# Patient Record
Sex: MALE | Race: WHITE | URBAN - METROPOLITAN AREA
[De-identification: names, ages, dates, MRNs, and addresses within clinical notes are randomized per-mention and may not be internally consistent; named-entity substitution may affect disease eponyms.]

---

## 2019-02-11 ENCOUNTER — NEW PATIENT (OUTPATIENT)
Dept: URBAN - METROPOLITAN AREA CLINIC 30 | Facility: CLINIC | Age: 79
End: 2019-02-11
Payer: MEDICARE

## 2019-02-11 PROCEDURE — 92004 COMPRE OPH EXAM NEW PT 1/>: CPT | Performed by: OPTOMETRIST

## 2019-02-11 PROCEDURE — 92134 CPTRZ OPH DX IMG PST SGM RTA: CPT | Performed by: OPTOMETRIST

## 2019-02-11 ASSESSMENT — VISUAL ACUITY
OS: 20/30
OD: 20/30

## 2019-02-11 ASSESSMENT — INTRAOCULAR PRESSURE
OD: 27
OS: 25

## 2019-02-11 ASSESSMENT — KERATOMETRY
OS: 43.87
OD: 43.95

## 2019-03-15 ENCOUNTER — FOLLOW UP ESTABLISHED (OUTPATIENT)
Dept: URBAN - METROPOLITAN AREA CLINIC 30 | Facility: CLINIC | Age: 79
End: 2019-03-15
Payer: MEDICARE

## 2019-03-15 PROCEDURE — 92133 CPTRZD OPH DX IMG PST SGM ON: CPT | Performed by: OPTOMETRIST

## 2019-03-15 PROCEDURE — 76514 ECHO EXAM OF EYE THICKNESS: CPT | Performed by: OPTOMETRIST

## 2019-03-15 PROCEDURE — 99213 OFFICE O/P EST LOW 20 MIN: CPT | Performed by: OPTOMETRIST

## 2019-03-15 PROCEDURE — 92083 EXTENDED VISUAL FIELD XM: CPT | Performed by: OPTOMETRIST

## 2019-03-15 ASSESSMENT — INTRAOCULAR PRESSURE
OD: 20
OS: 18

## 2019-07-15 ENCOUNTER — FOLLOW UP ESTABLISHED (OUTPATIENT)
Dept: URBAN - METROPOLITAN AREA CLINIC 30 | Facility: CLINIC | Age: 79
End: 2019-07-15
Payer: MEDICARE

## 2019-07-15 PROCEDURE — 99213 OFFICE O/P EST LOW 20 MIN: CPT | Performed by: OPTOMETRIST

## 2019-07-15 PROCEDURE — 92083 EXTENDED VISUAL FIELD XM: CPT | Performed by: OPTOMETRIST

## 2019-07-15 ASSESSMENT — INTRAOCULAR PRESSURE
OD: 26
OS: 23
OD: 23
OS: 20

## 2019-12-10 ENCOUNTER — FOLLOW UP ESTABLISHED (OUTPATIENT)
Dept: URBAN - METROPOLITAN AREA CLINIC 30 | Facility: CLINIC | Age: 79
End: 2019-12-10
Payer: MEDICARE

## 2019-12-10 DIAGNOSIS — H04.123 TEAR FILM INSUFFICIENCY OF BILATERAL LACRIMAL GLANDS: ICD-10-CM

## 2019-12-10 PROCEDURE — 92014 COMPRE OPH EXAM EST PT 1/>: CPT | Performed by: OPTOMETRIST

## 2019-12-10 PROCEDURE — 92134 CPTRZ OPH DX IMG PST SGM RTA: CPT | Performed by: OPTOMETRIST

## 2019-12-10 ASSESSMENT — INTRAOCULAR PRESSURE
OS: 20
OD: 22

## 2019-12-10 ASSESSMENT — VISUAL ACUITY
OD: 20/40
OS: 20/40

## 2019-12-10 ASSESSMENT — KERATOMETRY
OS: 44.20
OD: 44.09

## 2020-06-10 ENCOUNTER — FOLLOW UP ESTABLISHED (OUTPATIENT)
Dept: URBAN - METROPOLITAN AREA CLINIC 30 | Facility: CLINIC | Age: 80
End: 2020-06-10
Payer: MEDICARE

## 2020-06-10 PROCEDURE — 92083 EXTENDED VISUAL FIELD XM: CPT | Performed by: OPTOMETRIST

## 2020-06-10 PROCEDURE — 99213 OFFICE O/P EST LOW 20 MIN: CPT | Performed by: OPTOMETRIST

## 2020-06-10 PROCEDURE — 92133 CPTRZD OPH DX IMG PST SGM ON: CPT | Performed by: OPTOMETRIST

## 2020-06-10 ASSESSMENT — INTRAOCULAR PRESSURE
OS: 19
OD: 21

## 2021-01-08 ENCOUNTER — FOLLOW UP ESTABLISHED (OUTPATIENT)
Dept: URBAN - METROPOLITAN AREA CLINIC 30 | Facility: CLINIC | Age: 81
End: 2021-01-08
Payer: MEDICARE

## 2021-01-08 DIAGNOSIS — H02.834 DERMATOCHALASIS OF LEFT UPPER EYELID: ICD-10-CM

## 2021-01-08 DIAGNOSIS — H02.831 DERMATOCHALASIS OF RIGHT UPPER EYELID: ICD-10-CM

## 2021-01-08 DIAGNOSIS — Z79.84 LONG TERM (CURRENT) USE OF ORAL HYPOGLYCEMIC DRUGS: ICD-10-CM

## 2021-01-08 DIAGNOSIS — E11.9 TYPE 2 DIABETES MELLITUS WITHOUT COMPLICATIONS: Primary | ICD-10-CM

## 2021-01-08 DIAGNOSIS — H40.023 OPEN ANGLE WITH BORDERLINE FINDINGS, HIGH RISK, BILATERAL: ICD-10-CM

## 2021-01-08 PROCEDURE — 92134 CPTRZ OPH DX IMG PST SGM RTA: CPT | Performed by: OPTOMETRIST

## 2021-01-08 PROCEDURE — 92014 COMPRE OPH EXAM EST PT 1/>: CPT | Performed by: OPTOMETRIST

## 2021-01-08 ASSESSMENT — KERATOMETRY
OD: 43.98
OS: 44.12

## 2021-01-08 ASSESSMENT — VISUAL ACUITY
OS: 20/30
OD: 20/30

## 2021-01-08 ASSESSMENT — INTRAOCULAR PRESSURE
OS: 19
OD: 19

## 2021-07-08 ENCOUNTER — OFFICE VISIT (OUTPATIENT)
Dept: URBAN - METROPOLITAN AREA CLINIC 30 | Facility: CLINIC | Age: 81
End: 2021-07-08
Payer: MEDICARE

## 2021-07-08 PROCEDURE — 92133 CPTRZD OPH DX IMG PST SGM ON: CPT | Performed by: OPTOMETRIST

## 2021-07-08 PROCEDURE — 92083 EXTENDED VISUAL FIELD XM: CPT | Performed by: OPTOMETRIST

## 2021-07-08 PROCEDURE — 99213 OFFICE O/P EST LOW 20 MIN: CPT | Performed by: OPTOMETRIST

## 2021-07-08 ASSESSMENT — INTRAOCULAR PRESSURE
OS: 15
OD: 20

## 2021-07-08 NOTE — IMPRESSION/PLAN
Impression: Open angle with borderline findings, high risk, bilateral
-FHx PACHS 589/582 Plan: IOP stable OD, lower OS today, reasonable. Due to higher IOPs and cupping. RNFL 3/2019 Normal NFL OU. RNFL 6/2020 stable to last, normal NFL OU. RNFL 7/21 stable. VF 3/2019 unreliable OU, essentially full. VF 7/2019 unreliable OU, essentially full. VF 6/2020 essentially stable OU, less reliable OS. VF 7/21 mildly unreliable OU, essentially full.

## 2022-01-10 ENCOUNTER — OFFICE VISIT (OUTPATIENT)
Dept: URBAN - METROPOLITAN AREA CLINIC 30 | Facility: CLINIC | Age: 82
End: 2022-01-10
Payer: MEDICARE

## 2022-01-10 DIAGNOSIS — H25.813 COMBINED FORMS OF AGE-RELATED CATARACT, BILATERAL: ICD-10-CM

## 2022-01-10 PROCEDURE — 92134 CPTRZ OPH DX IMG PST SGM RTA: CPT | Performed by: OPTOMETRIST

## 2022-01-10 PROCEDURE — 92014 COMPRE OPH EXAM EST PT 1/>: CPT | Performed by: OPTOMETRIST

## 2022-01-10 ASSESSMENT — KERATOMETRY
OD: 44.18
OS: 43.95

## 2022-01-10 ASSESSMENT — INTRAOCULAR PRESSURE
OS: 22
OD: 21

## 2022-01-10 ASSESSMENT — VISUAL ACUITY
OS: 20/50
OD: 20/40

## 2022-01-10 NOTE — IMPRESSION/PLAN
Impression: Open angle with borderline findings, high risk, bilateral
-FHx PACHS 589/582 Plan: IOP today (21,22), higher OS. Due to higher IOPs and cupping. RNFL 3/2019 Normal NFL OU. RNFL 6/2020 stable to last, normal NFL OU. RNFL 7/21 stable. VF 3/2019 unreliable OU, essentially full. VF 7/2019 unreliable OU, essentially full. VF 6/2020 essentially stable OU, less reliable OS. VF 7/21 mildly unreliable OU, essentially full. Continue q6mo monitoring. Update diagnostics next visit.

## 2022-01-10 NOTE — IMPRESSION/PLAN
Impression: Combined forms of age-related cataract, bilateral Plan: Patient is comfortable c current level of vision. Continue to monitor. Discussed limitations.

## 2022-07-18 ENCOUNTER — OFFICE VISIT (OUTPATIENT)
Dept: URBAN - METROPOLITAN AREA CLINIC 30 | Facility: CLINIC | Age: 82
End: 2022-07-18
Payer: MEDICARE

## 2022-07-18 DIAGNOSIS — E11.9 TYPE 2 DIABETES MELLITUS WITHOUT COMPLICATIONS: ICD-10-CM

## 2022-07-18 DIAGNOSIS — H43.812 VITREOUS DEGENERATION, LEFT EYE: ICD-10-CM

## 2022-07-18 DIAGNOSIS — Z79.84 LONG TERM (CURRENT) USE OF ORAL HYPOGLYCEMIC DRUGS: ICD-10-CM

## 2022-07-18 DIAGNOSIS — H40.023 OPEN ANGLE WITH BORDERLINE FINDINGS, HIGH RISK, BILATERAL: Primary | ICD-10-CM

## 2022-07-18 DIAGNOSIS — H25.813 COMBINED FORMS OF AGE-RELATED CATARACT, BILATERAL: ICD-10-CM

## 2022-07-18 PROCEDURE — 99213 OFFICE O/P EST LOW 20 MIN: CPT | Performed by: STUDENT IN AN ORGANIZED HEALTH CARE EDUCATION/TRAINING PROGRAM

## 2022-07-18 PROCEDURE — 92134 CPTRZ OPH DX IMG PST SGM RTA: CPT | Performed by: STUDENT IN AN ORGANIZED HEALTH CARE EDUCATION/TRAINING PROGRAM

## 2022-07-18 ASSESSMENT — INTRAOCULAR PRESSURE
OD: 20
OS: 20

## 2022-07-18 NOTE — IMPRESSION/PLAN
Impression: Open angle with borderline findings, high risk, bilateral
-FHx PACHS 589/582 Plan: IOP today ~stable @20/20 Keep appt as scheduled for VF/IOP with Dr Jing Rios

## 2022-07-18 NOTE — IMPRESSION/PLAN
Impression: Type 2 diabetes mellitus without complications Plan: Pt ed stability with no retinopathy on exam today. Reviewed importance of BG control.  Monitor

## 2022-07-18 NOTE — IMPRESSION/PLAN
Impression: Combined forms of age-related cataract, bilateral Plan: Cataracts contribute to reduced vision however ADLs not affected. No treatment recommended at this time.  Monitor

## 2022-07-18 NOTE — IMPRESSION/PLAN
Impression: Vitreous degeneration, left eye: H43.812. Plan: PVD with puente ring. PHF no longer visible in OCT mac OS. Pt ed cause of floater, reassured pt no retinal breaks or detachment. Pt ed symptoms of RD/RT and to seek care immediately if noted. Monitor.

## 2022-07-29 ENCOUNTER — OFFICE VISIT (OUTPATIENT)
Dept: URBAN - METROPOLITAN AREA CLINIC 30 | Facility: CLINIC | Age: 82
End: 2022-07-29
Payer: MEDICARE

## 2022-07-29 DIAGNOSIS — H40.023 OPEN ANGLE WITH BORDERLINE FINDINGS, HIGH RISK, BILATERAL: Primary | ICD-10-CM

## 2022-07-29 PROCEDURE — 92083 EXTENDED VISUAL FIELD XM: CPT | Performed by: OPTOMETRIST

## 2022-07-29 PROCEDURE — 99213 OFFICE O/P EST LOW 20 MIN: CPT | Performed by: OPTOMETRIST

## 2022-07-29 PROCEDURE — 92133 CPTRZD OPH DX IMG PST SGM ON: CPT | Performed by: OPTOMETRIST

## 2022-07-29 ASSESSMENT — INTRAOCULAR PRESSURE
OD: 20
OS: 18

## 2022-07-29 NOTE — IMPRESSION/PLAN
Impression: Open angle with borderline findings, high risk, bilateral
-FHx PACHS 589/582 Plan: IOP today (20,18) s tx. Due to higher IOPs and cupping. RNFL 3/2019 Normal NFL OU. RNFL 6/2020 stable to last, normal NFL OU. RNFL 7/21 stable. RNFL 7/2022: normal NFL OU, appears stable. VF 3/2019 unreliable OU, essentially full. VF 7/2019 unreliable OU, essentially full. VF 6/2020 essentially stable OU, less reliable OS. VF 7/21 mildly unreliable OU, essentially full. VF 7/2022: superior arc OD, full OS.  

Lid may have impacted VF test today OD, will need to repeat c LIDS TAPED

## 2022-08-26 ENCOUNTER — OFFICE VISIT (OUTPATIENT)
Dept: URBAN - METROPOLITAN AREA CLINIC 30 | Facility: CLINIC | Age: 82
End: 2022-08-26
Payer: MEDICARE

## 2022-08-26 DIAGNOSIS — H40.023 OPEN ANGLE WITH BORDERLINE FINDINGS, HIGH RISK, BILATERAL: Primary | ICD-10-CM

## 2022-08-26 PROCEDURE — 92083 EXTENDED VISUAL FIELD XM: CPT | Performed by: OPTOMETRIST

## 2022-08-26 PROCEDURE — 99212 OFFICE O/P EST SF 10 MIN: CPT | Performed by: OPTOMETRIST

## 2022-08-26 ASSESSMENT — INTRAOCULAR PRESSURE
OD: 19
OS: 17

## 2022-08-26 NOTE — IMPRESSION/PLAN
Impression: Open angle with borderline findings, high risk, bilateral
-FHx PACHS 589/582 Plan: IOP today (19,17) s tx. Due to higher IOPs and cupping. RNFL 3/2019 Normal NFL OU. RNFL 6/2020 stable to last, normal NFL OU. RNFL 7/21 stable. RNFL 7/2022: normal NFL OU, appears stable. VF 3/2019 unreliable OU, essentially full. VF 7/2019 unreliable OU, essentially full. VF 6/2020 essentially stable OU, less reliable OS. VF 7/21 mildly unreliable OU, essentially full. VF 7/2022: superior arc OD, full OS. VF 8/22 OD much improved with lid taped, just few inf defects. Continue to monitor s tx. Pt educ on findings. Will consider oculoplastics consult later-needs knee replacement.

## 2023-02-08 ENCOUNTER — OFFICE VISIT (OUTPATIENT)
Dept: URBAN - METROPOLITAN AREA CLINIC 30 | Facility: CLINIC | Age: 83
End: 2023-02-08
Payer: MEDICARE

## 2023-02-08 DIAGNOSIS — Z79.84 LONG TERM (CURRENT) USE OF ORAL HYPOGLYCEMIC DRUGS: ICD-10-CM

## 2023-02-08 DIAGNOSIS — H25.813 COMBINED FORMS OF AGE-RELATED CATARACT, BILATERAL: ICD-10-CM

## 2023-02-08 DIAGNOSIS — H40.023 OPEN ANGLE WITH BORDERLINE FINDINGS, HIGH RISK, BILATERAL: Primary | ICD-10-CM

## 2023-02-08 DIAGNOSIS — H43.812 VITREOUS DEGENERATION, LEFT EYE: ICD-10-CM

## 2023-02-08 DIAGNOSIS — E11.9 TYPE 2 DIABETES MELLITUS WITHOUT COMPLICATIONS: ICD-10-CM

## 2023-02-08 PROCEDURE — 92134 CPTRZ OPH DX IMG PST SGM RTA: CPT | Performed by: OPTOMETRIST

## 2023-02-08 PROCEDURE — 92014 COMPRE OPH EXAM EST PT 1/>: CPT | Performed by: OPTOMETRIST

## 2023-02-08 ASSESSMENT — VISUAL ACUITY
OD: 20/30
OS: 20/40

## 2023-02-08 ASSESSMENT — KERATOMETRY
OD: 44.17
OS: 44.01

## 2023-02-08 ASSESSMENT — INTRAOCULAR PRESSURE
OS: 18
OD: 18

## 2023-02-08 NOTE — IMPRESSION/PLAN
Impression: Combined forms of age-related cataract, bilateral Plan: Cataracts contribute to reduced vision. Discussed surgery. Pt understands but had recent knee surgery and wants to wait.

## 2023-02-08 NOTE — IMPRESSION/PLAN
Impression: Vitreous degeneration, left eye: H43.812. Plan: Stable. Pt ed cause of floater, reassured pt no retinal breaks or detachment. Pt ed symptoms of RD/RT and to seek care immediately if noted. Monitor.

## 2023-02-08 NOTE — IMPRESSION/PLAN
Impression: Open angle with borderline findings, high risk, bilateral
-FHx PACHS 589/582 Plan: IOP today 18 OU s tx. Due to higher IOPs and cupping. RNFL 3/2019 Normal NFL OU. RNFL 6/2020 stable to last, normal NFL OU. RNFL 7/21 stable. RNFL 7/2022: normal NFL OU, appears stable. VF 3/2019 unreliable OU, essentially full. VF 7/2019 unreliable OU, essentially full. VF 6/2020 essentially stable OU, less reliable OS. VF 7/21 mildly unreliable OU, essentially full. VF 7/2022: superior arc OD, full OS. VF 8/22 OD much improved with lid taped, just few inf defects. Continue to monitor s tx. Pt educ on findings.  ALWAYS TAPE LIDS

## 2023-09-08 ENCOUNTER — OFFICE VISIT (OUTPATIENT)
Dept: URBAN - METROPOLITAN AREA CLINIC 30 | Facility: CLINIC | Age: 83
End: 2023-09-08
Payer: MEDICARE

## 2023-09-08 DIAGNOSIS — H40.023 OPEN ANGLE WITH BORDERLINE FINDINGS, HIGH RISK, BILATERAL: Primary | ICD-10-CM

## 2023-09-08 PROCEDURE — 92083 EXTENDED VISUAL FIELD XM: CPT | Performed by: OPTOMETRIST

## 2023-09-08 PROCEDURE — 92133 CPTRZD OPH DX IMG PST SGM ON: CPT | Performed by: OPTOMETRIST

## 2023-09-08 PROCEDURE — 99212 OFFICE O/P EST SF 10 MIN: CPT | Performed by: OPTOMETRIST

## 2023-09-08 ASSESSMENT — INTRAOCULAR PRESSURE
OD: 20
OS: 20

## 2024-03-07 ENCOUNTER — OFFICE VISIT (OUTPATIENT)
Dept: URBAN - METROPOLITAN AREA CLINIC 30 | Facility: CLINIC | Age: 84
End: 2024-03-07
Payer: MEDICARE

## 2024-03-07 DIAGNOSIS — H40.023 OPEN ANGLE WITH BORDERLINE FINDINGS, HIGH RISK, BILATERAL: ICD-10-CM

## 2024-03-07 DIAGNOSIS — E11.9 TYPE 2 DIABETES MELLITUS WITHOUT COMPLICATIONS: Primary | ICD-10-CM

## 2024-03-07 DIAGNOSIS — Z79.84 LONG TERM (CURRENT) USE OF ORAL HYPOGLYCEMIC DRUGS: ICD-10-CM

## 2024-03-07 DIAGNOSIS — H43.812 VITREOUS DEGENERATION, LEFT EYE: ICD-10-CM

## 2024-03-07 DIAGNOSIS — H02.831 DERMATOCHALASIS OF RIGHT UPPER EYELID: ICD-10-CM

## 2024-03-07 DIAGNOSIS — H25.813 COMBINED FORMS OF AGE-RELATED CATARACT, BILATERAL: ICD-10-CM

## 2024-03-07 DIAGNOSIS — H02.834 DERMATOCHALASIS OF LEFT UPPER EYELID: ICD-10-CM

## 2024-03-07 PROCEDURE — 92014 COMPRE OPH EXAM EST PT 1/>: CPT | Performed by: OPTOMETRIST

## 2024-03-07 PROCEDURE — 92134 CPTRZ OPH DX IMG PST SGM RTA: CPT | Performed by: OPTOMETRIST

## 2024-03-07 PROCEDURE — 92133 CPTRZD OPH DX IMG PST SGM ON: CPT | Performed by: OPTOMETRIST

## 2024-03-07 ASSESSMENT — VISUAL ACUITY
OS: 20/30
OD: 20/25

## 2024-03-07 ASSESSMENT — KERATOMETRY
OS: 43.84
OD: 44.18

## 2024-03-07 ASSESSMENT — INTRAOCULAR PRESSURE
OS: 16
OD: 17

## 2025-03-05 ENCOUNTER — OFFICE VISIT (OUTPATIENT)
Dept: URBAN - METROPOLITAN AREA CLINIC 30 | Facility: CLINIC | Age: 85
End: 2025-03-05
Payer: MEDICARE

## 2025-03-05 DIAGNOSIS — H43.812 VITREOUS DEGENERATION, LEFT EYE: ICD-10-CM

## 2025-03-05 DIAGNOSIS — H04.123 TEAR FILM INSUFFICIENCY OF BILATERAL LACRIMAL GLANDS: ICD-10-CM

## 2025-03-05 DIAGNOSIS — Z79.84 LONG TERM (CURRENT) USE OF ORAL HYPOGLYCEMIC DRUGS: ICD-10-CM

## 2025-03-05 DIAGNOSIS — H02.831 DERMATOCHALASIS OF RIGHT UPPER EYELID: ICD-10-CM

## 2025-03-05 DIAGNOSIS — H40.023 OPEN ANGLE WITH BORDERLINE FINDINGS, HIGH RISK, BILATERAL: ICD-10-CM

## 2025-03-05 DIAGNOSIS — H02.834 DERMATOCHALASIS OF LEFT UPPER EYELID: ICD-10-CM

## 2025-03-05 DIAGNOSIS — H25.813 COMBINED FORMS OF AGE-RELATED CATARACT, BILATERAL: ICD-10-CM

## 2025-03-05 DIAGNOSIS — E11.9 TYPE 2 DIABETES MELLITUS WITHOUT COMPLICATIONS: Primary | ICD-10-CM

## 2025-03-05 ASSESSMENT — VISUAL ACUITY
OD: 20/30
OS: 20/30

## 2025-03-05 ASSESSMENT — KERATOMETRY
OD: 44.25
OS: 43.88

## 2025-03-05 ASSESSMENT — INTRAOCULAR PRESSURE
OD: 18
OS: 18